# Patient Record
Sex: FEMALE | Race: WHITE | NOT HISPANIC OR LATINO | Employment: FULL TIME | URBAN - NONMETROPOLITAN AREA
[De-identification: names, ages, dates, MRNs, and addresses within clinical notes are randomized per-mention and may not be internally consistent; named-entity substitution may affect disease eponyms.]

---

## 2017-11-02 ENCOUNTER — HOSPITAL ENCOUNTER (OUTPATIENT)
Dept: FAMILY MEDICINE CLINIC | Facility: CLINIC | Age: 42
Setting detail: SPECIMEN
Discharge: HOME | End: 2017-11-02
Attending: INTERNAL MEDICINE | Admitting: INTERNAL MEDICINE

## 2017-11-02 LAB
ANION GAP SERPL CALC-SCNC: 12 MMOL/L (ref 10–20)
BUN SERPL-MCNC: 8 MG/DL (ref 8–20)
BUN/CREAT SERPL: 13.3 (ref 5.4–26.2)
CALCIUM SERPL-MCNC: 9.4 MG/DL (ref 8.9–10.3)
CHLORIDE SERPL-SCNC: 102 MMOL/L (ref 101–111)
CONV CO2: 28 MMOL/L (ref 22–32)
CREAT UR-MCNC: 0.6 MG/DL (ref 0.4–1)
GLUCOSE SERPL-MCNC: 103 MG/DL (ref 65–99)
POTASSIUM SERPL-SCNC: 4 MMOL/L (ref 3.6–5.1)
SODIUM SERPL-SCNC: 138 MMOL/L (ref 136–144)

## 2018-06-14 ENCOUNTER — HOSPITAL ENCOUNTER (OUTPATIENT)
Dept: FAMILY MEDICINE CLINIC | Facility: CLINIC | Age: 43
Setting detail: SPECIMEN
Discharge: HOME | End: 2018-06-14
Attending: INTERNAL MEDICINE | Admitting: INTERNAL MEDICINE

## 2018-06-14 LAB
ANION GAP SERPL CALC-SCNC: 13.9 MMOL/L (ref 10–20)
BUN SERPL-MCNC: 5 MG/DL (ref 8–20)
BUN/CREAT SERPL: 6.3 (ref 5.4–26.2)
CALCIUM SERPL-MCNC: 9.2 MG/DL (ref 8.9–10.3)
CHLORIDE SERPL-SCNC: 101 MMOL/L (ref 101–111)
CONV CO2: 26 MMOL/L (ref 22–32)
CREAT UR-MCNC: 0.8 MG/DL (ref 0.4–1)
GLUCOSE SERPL-MCNC: 86 MG/DL (ref 65–99)
POTASSIUM SERPL-SCNC: 3.9 MMOL/L (ref 3.6–5.1)
SODIUM SERPL-SCNC: 137 MMOL/L (ref 136–144)

## 2019-06-05 ENCOUNTER — CONVERSION ENCOUNTER (OUTPATIENT)
Dept: OTHER | Facility: HOSPITAL | Age: 44
End: 2019-06-05

## 2019-06-05 VITALS
WEIGHT: 293 LBS | DIASTOLIC BLOOD PRESSURE: 74 MMHG | HEIGHT: 65 IN | HEART RATE: 82 BPM | BODY MASS INDEX: 48.82 KG/M2 | SYSTOLIC BLOOD PRESSURE: 107 MMHG | OXYGEN SATURATION: 97 %

## 2019-06-06 NOTE — PROGRESS NOTES
Visit Type:  Follow-up Visit  Primary Care Provider:  Dr. Cayetano Bustillos    Chief Complaint:  6 month: Pedal edema unchanged on Lasix . Tried Chantix and but con't to smoke. .    History of Present Illness:  Dear Dr Bustillos    I had the pleasure of seeing Luiza today in follow-up.  Ensure well aware this is a pleasant 43-year-old  female with no known history of ischemic heart disease.  She does however have a history of hypertension, tachycardia, tobacco abuse and   peripheral edema.  She presents today for follow-up on the above conditions.    She denies any chest pain pressure heaviness or tightness.  She denies any shortness of breath out of character.  She denies any PND orthopnea.  She denies any syncope or near-syncope.      She reports that her edema has not significantly improved. She reports that hearing to the fluid restriction however on further questioning she is consuming well over 2 L of fluid daily.  We discussed increasing physical activity levels.  We also discussed   compression hose artery.  She will be more diligent with her fluid restriction.  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    Impressions  1. peripheral edema -unchanged  2. Hypertension  3. History tachycardia.  4. Tobacco abuse.    Recommendations  Continuation of her current medical regimen at the present time.  Consider compression hose 3.  2 L fluid restriction  Smoking cessation  Follow-up in 6 months time sooner should there be difficulties.      Vital Signs -Extended   Height: 65 inches   Weight: 307.4 pounds  Pulse rate: 82 /min     Pulse rhythm: regular  O2 Sat: 97%  Blood Pressure: 107/74 mm Hg    Calculations   Body Mass Index: 51.34      Body Surface Area (m2): 2.38      Past Medical History:     Reviewed history from 06/06/2018 and no changes required:        Hyperlipidemia        Anxiety        Depression        Hypertension        Palpitations          Tachycardia         Obesity         Osteoarthritis        Bulging Disc         FRANSISCO: C-pap machine     Past Surgical History:     Reviewed history from 10/16/2017 and no changes required:        Hysterectomy :          : ,,        Tonsillectomy    Active Medications:  LORAZEPAM 0.5 MG ORAL TABLET (LORAZEPAM) As needed  ABILIFY 20 MG ORAL TABLET (ARIPIPRAZOLE) Take 1 tablet by mouth daily  LAMICTAL 25 MG ORAL TABLET (LAMOTRIGINE) Take 2 tablets by mouth daily  * VITAMIN D-2  50,000 IU Every week  FOLIC ACID 1 MG ORAL TABLET (FOLIC ACID) Take 1 tablet by mouth daily  METOPROLOL ER 25MG  TAB (METOPROLOL SUCCINATE) TAKE 1 TABLET BY MOUTH ONCE DAILY  TRAZODONE  MG ORAL TABLET (TRAZODONE HCL) Take 2 tablets by mouth daily  POTASSIUM CHLORIDE ER 20 MEQ ORAL TABLET EXTENDED RELEASE (POTASSIUM CHLORIDE) Take 2 tablet by mouth daily  FUROSEMIDE 40 MG ORAL TABLET (FUROSEMIDE) One tablet by mouth daily  ESCITALOPRAM OXALATE 20 MG ORAL TABLET (ESCITALOPRAM OXALATE) Take one (1) tablet by mouth daily.    Current Allergies:  * LISINOPRIL (Critical)  * ATORVASTATIN (Critical)    Family History Summary:      Reviewed history Last on 2018 and no changes required:2019  Brother - Has Coronary Heart Disease - Brother had 2 MI's and PCI/ stenting - Entered On: 10/16/2017  Mother - Has Other Medical Problems - Rheumatoid Arthritis - Entered On: 10/12/2017  Mother - Has Lung Cancer - Entered On: 10/12/2017      Social History:     Reviewed history from 2018 and no changes required:         Marital Status:          Children: 3                Occupation: Employed        Risk Factors:     Smoked Tobacco Use:  Current every day smoker     Cigarettes:  Yes -- 1 to 1.5 pack(s) per day,      Year started:  age 20         Years smoked:  21  Smokeless Tobacco Use:  Never     Counseled to quit/cut down:  yes  Passive smoke exposure:  yes  Drug use:  no  HIV high-risk behavior:  no  Caffeine  use:  3 drinks per day  Alcohol use:  yes     Type:  rarely - wine    Family History Risk Factors:     Family History of MI in females < 65 years old:  no     Family History of MI in males < 55 years old:  yes        Review of Systems     A complete review of systems was undertaken with a pertinent information being listed in history present illness and all other systems being negative.      Physical Exam    General:      well developed, well nourished, in no acute distress.    Head:      normocephalic and atraumatic.    Eyes:      No icterus or conjunctivitis.    Mouth:      Mucous membranes moist no lesions or ulcerations.    Neck:      Supple no lymphadenopathy JVD or bruit.    Lungs:      clear bilaterally to auscultation.    Heart:      non-displaced PMI, chest non-tender; regular rate and rhythm, S1, S2 without murmurs, rubs, or gallops  Abdomen:      Soft nontender nondistended bowel sounds positive throughout.  obese  Pulses:      pulses normal in all 4 extremities.    Extremities:      +edema  Neurologic:      Alert and oriented x3 cranial nerves II through XII are grossly intact.    Psych:      alert and cooperative; normal mood and affect; normal attention span and concentration.      Diabetes Management Exam:      Foot Exam (with socks and/or shoes not present):        Pulses:           pulses normal in all 4 extremities.        Blood Pressure:  Today's BP: 107/74 mm Hg    Labwork:   Most Recent Lab Results:   LDL: 171 mg/dL 04/19/2017      Detailed Cardiovascular Exam    Neck     Carotids: Carotids full and equal bilaterally without bruits.       Neck Veins: Normal, no JVD.      Heart     Inspection: no deformities or lifts noted.       Palpation: normal PMI with no thrills palpable.       Auscultation: regular rate and rhythm, S1, S2 without murmurs, rubs, gallops, or clicks.      Vascular     Pedal Pulses: pulses normal in all 4 extremities.       Radial Pulses: normal radial pulses bilaterally.        Peripheral Circulation: no clubbing, cyanosis,  noted with normal capillary refill.  1+ edema      EKG Interpretation   Comments:  normal sinus rhythm with a ventricular rate of 82 beats per minute.  Low voltage in the precordial leads.  Early repolarization.  Normal QT and QTC intervals.    Assessment   Status of Existing Problems:  Assessed Peripheral edema as unchanged - Nils Reyes MD  Assessed PALPITATIONS as unchanged - Nils Reyes MD  Assessed Hypertension as unchanged - Nils Reyes MD  Assessed Hyperlipidemia as unchanged - Nils Reyes MD  New Problems:  Dx of OBESITY (ICD-278.00)  Onset: 06/05/2019    Plan   New medications:  LAMICTAL 100 MG ORAL TABLET -- Take one (1) tablet by mouth daily.  Start date: 12/05/2018          Patient Instructions:  1)  Please schedule a follow-up appointment in 6 months.  2)  2L fluid restrition  3)  Consider compression stockings  4)  F/U 6 months                Medication Administration    Orders Added:  1)  EKG (In House) [14874]  2)  Ofc Vst, Est Level IV [03965]    ]      Electronically signed by Nils Reyes MD on 06/05/2019 at 2:16 PM  ________________________________________________________________________       Disclaimer: Converted Note message may not contain all data elements that existed in the legacy source system. Please see [employer] Legacy System for the original note details.

## 2019-08-06 RX ORDER — POTASSIUM CHLORIDE 20 MEQ/1
20 TABLET, EXTENDED RELEASE ORAL 2 TIMES DAILY
Qty: 60 TABLET | Refills: 6 | Status: SHIPPED | OUTPATIENT
Start: 2019-08-06 | End: 2021-12-06

## 2019-08-06 NOTE — TELEPHONE ENCOUNTER
Patient called to report her pharmacy [facility] in [place] has reported to her that they have tried to contact our office for refills on K+ , but no response.   Patient stated has been out of her K+ 20meq 2 tablets daily for 2 weeks.   Informed patient that no requests have been received on our end , but will send refill now.

## 2019-09-09 RX ORDER — FUROSEMIDE 40 MG/1
40 TABLET ORAL DAILY
Qty: 90 TABLET | Refills: 3 | Status: SHIPPED | OUTPATIENT
Start: 2019-09-09 | End: 2020-09-28

## 2019-12-04 ENCOUNTER — OFFICE VISIT (OUTPATIENT)
Dept: CARDIOLOGY | Facility: CLINIC | Age: 44
End: 2019-12-04

## 2019-12-04 VITALS
BODY MASS INDEX: 48.12 KG/M2 | WEIGHT: 288.8 LBS | HEIGHT: 65 IN | SYSTOLIC BLOOD PRESSURE: 111 MMHG | HEART RATE: 79 BPM | OXYGEN SATURATION: 96 % | DIASTOLIC BLOOD PRESSURE: 78 MMHG

## 2019-12-04 DIAGNOSIS — R60.9 PERIPHERAL EDEMA: ICD-10-CM

## 2019-12-04 DIAGNOSIS — Z72.0 TOBACCO USE: ICD-10-CM

## 2019-12-04 DIAGNOSIS — E66.01 CLASS 3 SEVERE OBESITY WITHOUT SERIOUS COMORBIDITY WITH BODY MASS INDEX (BMI) OF 45.0 TO 49.9 IN ADULT, UNSPECIFIED OBESITY TYPE (HCC): ICD-10-CM

## 2019-12-04 DIAGNOSIS — E78.2 MIXED HYPERLIPIDEMIA: ICD-10-CM

## 2019-12-04 DIAGNOSIS — I10 ESSENTIAL HYPERTENSION: Primary | ICD-10-CM

## 2019-12-04 PROCEDURE — 99214 OFFICE O/P EST MOD 30 MIN: CPT | Performed by: INTERNAL MEDICINE

## 2019-12-04 PROCEDURE — 93000 ELECTROCARDIOGRAM COMPLETE: CPT | Performed by: INTERNAL MEDICINE

## 2019-12-04 RX ORDER — LORAZEPAM 1 MG/1
1 TABLET ORAL DAILY PRN
Refills: 1 | COMMUNITY
Start: 2019-11-19 | End: 2023-01-16 | Stop reason: ALTCHOICE

## 2019-12-04 RX ORDER — METOPROLOL SUCCINATE 25 MG/1
TABLET, EXTENDED RELEASE ORAL DAILY
Refills: 7 | COMMUNITY
Start: 2019-10-18 | End: 2020-03-26 | Stop reason: SDUPTHER

## 2019-12-04 RX ORDER — LAMOTRIGINE 100 MG/1
100 TABLET ORAL DAILY
Refills: 1 | COMMUNITY
Start: 2019-11-19 | End: 2023-01-16 | Stop reason: ALTCHOICE

## 2019-12-04 RX ORDER — PRAVASTATIN SODIUM 20 MG
20 TABLET ORAL DAILY
Refills: 3 | COMMUNITY
Start: 2019-11-19 | End: 2022-07-18 | Stop reason: ALTCHOICE

## 2019-12-04 NOTE — PROGRESS NOTES
"Cardiology Office Visit      Encounter Date:  12/04/2019    Patient ID:   Luiza Felton is a 44 y.o. female.    Reason For Followup:  Hypertension  Peripheral edema    Brief Clinical History:  Dear Dr. Bustillos, Cayetano CAMACHO MD    I had the pleasure of seeing Luiza Felton today. As you are well aware, this is a 44 y.o. female with no known history of ischemic heart disease.  She does however have a history of hypertension, tachycardia, tobacco abuse and peripheral edema.  She presents today for follow-up on the above conditions.    Interval History:  She denies any chest pain pressure heaviness or tightness.  She denies any shortness of breath out of character.  She denies any PND orthopnea.  She denies any syncope or near-syncope.  She does report occasional lightheadedness.  She reports that her edema has significantly improved.  In fact she reports about 70% improvement since her last visit.    Assessment & Plan    Impressions:  Peripheral edema-improved with fluid restriction  Hypertension-well-controlled today.  History tachycardia.  Tobacco abuse.    Recommendations:  Continuation of her current cardiovascular regimen at the present time.  Smoking cessation  Follow-up in 1 years time sooner should there be difficulties.    Objective:    Vitals:  Vitals:    12/04/19 1202   BP: 111/78   Pulse: 79   SpO2: 96%   Weight: 131 kg (288 lb 12.8 oz)   Height: 165.1 cm (65\")       Physical Exam:    General: Alert, cooperative, no distress, appears stated age  Head:  Normocephalic, atraumatic, mucous membranes moist  Eyes:  Conjunctiva/corneas clear, EOM's intact     Neck:  Supple,  no adenopathy;      Lungs: Clear to auscultation bilaterally, no wheezes rhonchi rales are noted  Chest wall: No tenderness  Heart::  Regular rate and rhythm, S1 and S2 normal, no murmur, rub or gallop  Abdomen: Soft, non-tender, nondistended bowel sounds active.  Morbidly obese  Extremities: No cyanosis, clubbing, or edema  Pulses: 2+ and " symmetric all extremities.  Trace pedal edema  Skin:  No rashes or lesions  Neuro/psych: A&O x3. CN II through XII are grossly intact with appropriate affect      Allergies:  Allergies   Allergen Reactions   • Atorvastatin Myalgia   • Lisinopril Cough       Medication Review:     Current Outpatient Medications:   •  furosemide (LASIX) 40 MG tablet, Take 1 tablet by mouth Daily., Disp: 90 tablet, Rfl: 3  •  lamoTRIgine (LaMICtal) 200 MG tablet, Take 200 mg by mouth Daily., Disp: , Rfl: 1  •  LORazepam (ATIVAN) 0.5 MG tablet, Take 0.5 mg by mouth Daily As Needed., Disp: , Rfl: 1  •  metoprolol succinate XL (TOPROL-XL) 25 MG 24 hr tablet, Daily., Disp: , Rfl: 7  •  potassium chloride (K-DUR,KLOR-CON) 20 MEQ CR tablet, Take 1 tablet by mouth 2 (Two) Times a Day., Disp: 60 tablet, Rfl: 6  •  pravastatin (PRAVACHOL) 20 MG tablet, Take 20 mg by mouth Daily. as directed, Disp: , Rfl: 3    Family History:  Family History   Problem Relation Age of Onset   • Lung cancer Mother    • Rheum arthritis Mother    • Heart disease Brother    • Heart attack Brother    • Stroke Maternal Grandmother        Past Medical History:  Past Medical History:   Diagnosis Date   • Anxiety and depression    • Bulging lumbar disc    • Hyperlipidemia    • Hypertension    • Obesity    • Osteoarthritis    • Palpitations    • Peripheral edema    • Sleep apnea     C-pap machine    • Tachycardia        Past surgical History:  Past Surgical History:   Procedure Laterality Date   •  SECTION     • HYSTERECTOMY     • TONSILLECTOMY         Social History:  Social History     Socioeconomic History   • Marital status:      Spouse name: Not on file   • Number of children: Not on file   • Years of education: Not on file   • Highest education level: Not on file   Tobacco Use   • Smoking status: Current Every Day Smoker     Packs/day: 1.00     Types: Cigarettes   • Smokeless tobacco: Never Used   Substance and Sexual Activity   • Alcohol use: No      Frequency: Never   • Drug use: No       Review of Systems:  The following systems were reviewed as they relate to the cardiovascular system: Constitutional, Eyes, ENT, Cardiovascular, Respiratory, Gastrointestinal, Integumentary, Neurological, Psychiatric, Hematologic, Endocrine, Musculoskeletal, and Genitourinary. The pertinent cardiovascular findings are reported above with all other cardiovascular points within those systems being negative.    Diagnostic Study Review:     Current Electrocardiogram:    ECG 12 Lead  Date/Time: 12/4/2019 5:56 PM  Performed by: Nils Reyes DO  Authorized by: Nils Reyes DO   Comparison: not compared with previous ECG   Previous ECG: no previous ECG available  Comments: Normal sinus rhythm with a ventricular rate of 79 bpm.  Low voltage in the precordial leads.  Normal QT and QTc intervals.  Normal QRS axis.              NOTE: The following portions of the patient's history were reviewed and updated this visit as appropriate: allergies, current medications, past family history, past medical history, past social history, past surgical history and problem list.

## 2019-12-10 PROBLEM — R60.9 PERIPHERAL EDEMA: Status: ACTIVE | Noted: 2018-12-05

## 2019-12-10 PROBLEM — E66.9 OBESITY: Status: ACTIVE | Noted: 2019-06-05

## 2019-12-10 PROBLEM — Z72.0 TOBACCO USE: Status: ACTIVE | Noted: 2019-12-10

## 2019-12-10 PROBLEM — I10 HYPERTENSION: Status: ACTIVE | Noted: 2017-10-12

## 2019-12-10 PROBLEM — E78.5 HYPERLIPIDEMIA: Status: ACTIVE | Noted: 2017-10-12

## 2020-03-26 RX ORDER — METOPROLOL SUCCINATE 25 MG/1
25 TABLET, EXTENDED RELEASE ORAL DAILY
Qty: 30 TABLET | Refills: 3 | Status: SHIPPED | OUTPATIENT
Start: 2020-03-26 | End: 2020-09-28

## 2020-09-28 RX ORDER — FUROSEMIDE 40 MG/1
TABLET ORAL
Qty: 30 TABLET | Refills: 0 | Status: SHIPPED | OUTPATIENT
Start: 2020-09-28 | End: 2020-11-04

## 2020-09-28 RX ORDER — METOPROLOL SUCCINATE 25 MG/1
TABLET, EXTENDED RELEASE ORAL
Qty: 30 TABLET | Refills: 0 | Status: SHIPPED | OUTPATIENT
Start: 2020-09-28 | End: 2020-11-04

## 2020-11-04 RX ORDER — FUROSEMIDE 40 MG/1
TABLET ORAL
Qty: 30 TABLET | Refills: 6 | Status: SHIPPED | OUTPATIENT
Start: 2020-11-04 | End: 2021-08-04

## 2020-11-04 RX ORDER — METOPROLOL SUCCINATE 25 MG/1
TABLET, EXTENDED RELEASE ORAL
Qty: 30 TABLET | Refills: 6 | Status: SHIPPED | OUTPATIENT
Start: 2020-11-04 | End: 2021-08-04

## 2020-12-07 ENCOUNTER — OFFICE VISIT (OUTPATIENT)
Dept: CARDIOLOGY | Facility: CLINIC | Age: 45
End: 2020-12-07

## 2020-12-07 VITALS
DIASTOLIC BLOOD PRESSURE: 83 MMHG | RESPIRATION RATE: 18 BRPM | WEIGHT: 293 LBS | HEIGHT: 65 IN | BODY MASS INDEX: 48.82 KG/M2 | OXYGEN SATURATION: 98 % | HEART RATE: 79 BPM | SYSTOLIC BLOOD PRESSURE: 149 MMHG

## 2020-12-07 DIAGNOSIS — R60.9 PERIPHERAL EDEMA: ICD-10-CM

## 2020-12-07 DIAGNOSIS — E78.2 MIXED HYPERLIPIDEMIA: ICD-10-CM

## 2020-12-07 DIAGNOSIS — E66.01 CLASS 3 SEVERE OBESITY WITHOUT SERIOUS COMORBIDITY WITH BODY MASS INDEX (BMI) OF 45.0 TO 49.9 IN ADULT, UNSPECIFIED OBESITY TYPE (HCC): ICD-10-CM

## 2020-12-07 DIAGNOSIS — Z72.0 TOBACCO USE: ICD-10-CM

## 2020-12-07 DIAGNOSIS — I10 ESSENTIAL HYPERTENSION: Primary | ICD-10-CM

## 2020-12-07 PROCEDURE — 99214 OFFICE O/P EST MOD 30 MIN: CPT | Performed by: INTERNAL MEDICINE

## 2020-12-07 PROCEDURE — 93000 ELECTROCARDIOGRAM COMPLETE: CPT | Performed by: INTERNAL MEDICINE

## 2020-12-07 RX ORDER — ESZOPICLONE 2 MG/1
2 TABLET, FILM COATED ORAL NIGHTLY
COMMUNITY
End: 2023-01-16 | Stop reason: ALTCHOICE

## 2020-12-07 RX ORDER — ESCITALOPRAM OXALATE 20 MG/1
20 TABLET ORAL DAILY
COMMUNITY
End: 2023-01-16 | Stop reason: ALTCHOICE

## 2020-12-07 NOTE — PROGRESS NOTES
"Cardiology Office Visit      Encounter Date:  12/07/2020    Patient ID:   Luiza Felton is a 45 y.o. female.    Reason For Followup:  Peripheral edema  Hypertension    Brief Clinical History:  Dear Dr. Bustillos, Cayetano CAMACHO MD    I had the pleasure of seeing Luiza Felton today. As you are well aware, this is a 45 y.o. female with no known history of ischemic heart disease.  She does however have a history of hypertension, tachycardia, tobacco abuse and peripheral edema.  She presents today for follow-up on the above conditions.    Interval History:  She denies any chest pain pressure heaviness or tightness.  She denies any shortness of breath out of character.  She denies any PND orthopnea.  She denies any syncope or near-syncope.  She does report occasional lightheadedness.  She reports that her edema has remained stagnant since her last visit.    She additionally reports that she is not taking her potassium because the pills make her sick.  She continues to watch her fluids although it is difficult.  She is also still smoking.  Her blood pressure is elevated today and she is not checking her blood pressure at home.  She will get some labs to check her potassium and she will monitor her blood pressure for the next 2 weeks and call with values.  It may be that she needs liquid potassium.    Assessment & Plan    Impressions:  Peripheral edema-improved with fluid restriction  Hypertension-well-controlled today.  History tachycardia.  Tobacco abuse.    Recommendations:  Continuation of her current cardiovascular regimen at the present time.  Check labs: CBC, CMP, lipids  Check blood pressure and notify of values in 2 weeks  Smoking cessation  Follow-up in 1 years time sooner should there be difficulties.    Objective:    Vitals:  Vitals:    12/07/20 1057   BP: 149/83   BP Location: Left arm   Pulse: 79   Resp: 18   SpO2: 98%   Weight: (!) 139 kg (306 lb 12.8 oz)   Height: 165.1 cm (65\")       Physical " Exam:    General: Alert, cooperative, no distress, appears stated age  Head:  Normocephalic, atraumatic, mucous membranes moist  Eyes:  Conjunctiva/corneas clear, EOM's intact     Neck:  Supple,  no bruit  Lungs: Clear to auscultation bilaterally, no wheezes rhonchi rales are noted  Chest wall: No tenderness  Heart::  Regular rate and rhythm, S1 and S2 normal, 1/6 holosystolic murmur.  No rub or gallop  Abdomen: Soft, non-tender, nondistended bowel sounds active.  Obese  Extremities: No cyanosis, clubbing, or edema  Pulses: 2+ and symmetric all extremities  Skin:  No rashes or lesions  Neuro/psych: A&O x3. CN II through XII are grossly intact with appropriate affect      Allergies:  Allergies   Allergen Reactions   • Atorvastatin Myalgia   • Lisinopril Cough       Medication Review:     Current Outpatient Medications:   •  Cholecalciferol (vitamin D3) 125 MCG (5000 UT) capsule capsule, Take 5,000 Units by mouth Daily., Disp: , Rfl:   •  escitalopram (LEXAPRO) 20 MG tablet, Take 20 mg by mouth Daily., Disp: , Rfl:   •  eszopiclone (Lunesta) 2 MG tablet, Take 2 mg by mouth Every Night. Take immediately before bedtime, Disp: , Rfl:   •  furosemide (LASIX) 40 MG tablet, Take 1 tablet by mouth once daily, Disp: 30 tablet, Rfl: 6  •  lamoTRIgine (LaMICtal) 100 MG tablet, Take 100 mg by mouth Daily., Disp: , Rfl: 1  •  LORazepam (ATIVAN) 1 MG tablet, Take 1 mg by mouth Daily As Needed., Disp: , Rfl: 1  •  metoprolol succinate XL (TOPROL-XL) 25 MG 24 hr tablet, Take 1 tablet by mouth once daily, Disp: 30 tablet, Rfl: 6  •  potassium chloride (K-DUR,KLOR-CON) 20 MEQ CR tablet, Take 1 tablet by mouth 2 (Two) Times a Day. (Patient taking differently: Take 20 mEq by mouth Daily. 2 tablets by mouth daily), Disp: 60 tablet, Rfl: 6  •  pravastatin (PRAVACHOL) 20 MG tablet, Take 20 mg by mouth Daily. as directed, Disp: , Rfl: 3    Family History:  Family History   Problem Relation Age of Onset   • Lung cancer Mother    • Rheum  arthritis Mother    • Heart disease Brother    • Heart attack Brother    • Stroke Maternal Grandmother        Past Medical History:  Past Medical History:   Diagnosis Date   • Anxiety and depression    • Bulging lumbar disc    • Hyperlipidemia    • Hypertension    • Obesity    • Osteoarthritis    • Palpitations    • Peripheral edema    • Sleep apnea     C-pap machine    • Tachycardia        Past surgical History:  Past Surgical History:   Procedure Laterality Date   •  SECTION     • HYSTERECTOMY     • TONSILLECTOMY         Social History:  Social History     Socioeconomic History   • Marital status:      Spouse name: Not on file   • Number of children: Not on file   • Years of education: Not on file   • Highest education level: Not on file   Tobacco Use   • Smoking status: Current Every Day Smoker     Packs/day: 1.50     Types: Cigarettes   • Smokeless tobacco: Never Used   Substance and Sexual Activity   • Alcohol use: No     Frequency: Never   • Drug use: No       Review of Systems:  The following systems were reviewed as they relate to the cardiovascular system: Constitutional, Eyes, ENT, Cardiovascular, Respiratory, Gastrointestinal, Integumentary, Neurological, Psychiatric, Hematologic, Endocrine, Musculoskeletal, and Genitourinary. The pertinent cardiovascular findings are reported above with all other cardiovascular points within those systems being negative.    Diagnostic Study Review:     Current Electrocardiogram:    ECG 12 Lead    Date/Time: 2020 1:19 PM  Performed by: Nils Reyes DO  Authorized by: Nils Reyes DO   Comparison: not compared with previous ECG   Previous ECG: no previous ECG available  Comments: Normal sinus rhythm with a ventricular rate of 79 bpm.  Low voltage in the precordial leads.  Normal QT and QTc intervals.  Nonspecific repolarization changes.              NOTE: The following portions of the patient's history were reviewed and  updated this visit as appropriate: allergies, current medications, past family history, past medical history, past social history, past surgical history and problem list.

## 2021-08-04 RX ORDER — METOPROLOL SUCCINATE 25 MG/1
TABLET, EXTENDED RELEASE ORAL
Qty: 30 TABLET | Refills: 2 | Status: SHIPPED | OUTPATIENT
Start: 2021-08-04 | End: 2021-12-06 | Stop reason: SDUPTHER

## 2021-08-04 RX ORDER — FUROSEMIDE 40 MG/1
TABLET ORAL
Qty: 30 TABLET | Refills: 2 | Status: SHIPPED | OUTPATIENT
Start: 2021-08-04 | End: 2021-12-06 | Stop reason: SDUPTHER

## 2021-12-06 ENCOUNTER — OFFICE VISIT (OUTPATIENT)
Dept: CARDIOLOGY | Facility: CLINIC | Age: 46
End: 2021-12-06

## 2021-12-06 VITALS
SYSTOLIC BLOOD PRESSURE: 131 MMHG | DIASTOLIC BLOOD PRESSURE: 82 MMHG | HEART RATE: 79 BPM | HEIGHT: 65 IN | WEIGHT: 293 LBS | OXYGEN SATURATION: 99 % | BODY MASS INDEX: 48.82 KG/M2

## 2021-12-06 DIAGNOSIS — E78.1 HYPERTRIGLYCERIDEMIA: ICD-10-CM

## 2021-12-06 DIAGNOSIS — I10 PRIMARY HYPERTENSION: ICD-10-CM

## 2021-12-06 DIAGNOSIS — E78.2 MIXED HYPERLIPIDEMIA: Primary | ICD-10-CM

## 2021-12-06 DIAGNOSIS — R00.2 PALPITATIONS: ICD-10-CM

## 2021-12-06 PROCEDURE — 99214 OFFICE O/P EST MOD 30 MIN: CPT | Performed by: INTERNAL MEDICINE

## 2021-12-06 PROCEDURE — 93000 ELECTROCARDIOGRAM COMPLETE: CPT | Performed by: INTERNAL MEDICINE

## 2021-12-06 RX ORDER — METOPROLOL SUCCINATE 25 MG/1
25 TABLET, EXTENDED RELEASE ORAL DAILY
Qty: 90 TABLET | Refills: 3 | Status: SHIPPED | OUTPATIENT
Start: 2021-12-06 | End: 2023-01-16 | Stop reason: SDUPTHER

## 2021-12-06 RX ORDER — FUROSEMIDE 40 MG/1
40 TABLET ORAL DAILY
Qty: 90 TABLET | Refills: 3 | Status: SHIPPED | OUTPATIENT
Start: 2021-12-06

## 2021-12-06 RX ORDER — POTASSIUM CHLORIDE 20 MEQ/1
20 TABLET, EXTENDED RELEASE ORAL DAILY
Qty: 90 TABLET | Refills: 3 | Status: SHIPPED | OUTPATIENT
Start: 2021-12-06

## 2021-12-06 NOTE — PROGRESS NOTES
Cardiology Office Visit      Encounter Date:  12/06/2021    Patient ID:   Luiza Felton is a 46 y.o. female.    Reason For Followup:  Peripheral edema  Hypertension    Brief Clinical History:  Dear Dr. Bustillos, Cayetano CAMACHO MD    I had the pleasure of seeing Luiza Felton today. As you are well aware, this is a 46 y.o. female with no known history of ischemic heart disease.  She does however have a history of hypertension, tachycardia, tobacco abuse and peripheral edema.  She presents today for follow-up on the above conditions.    Interval History:  She denies any chest pain pressure heaviness or tightness.  She denies any shortness of breath out of character.  She denies any PND orthopnea.  She denies any syncope or near-syncope.      She is reporting an increase in her palpitations.  She reports that these can occur multiple times a week and sometimes multiple times a day.  She reports that she is having some difficulty taking her potassium because of the large caliber of the pill.  We discussed how taking a diuretic without the potassium supplementation can lead to electrical instability resulting in palpitations.  She will resume taking potassium and notify us if her palpitations are persistent.    Additionally she reports that she is having some difficulty with hyperlipidemia.  In fact with review of her laboratory data, it appears that this is primarily a hypertriglyceridemia issue.  Her triglycerides from November 2021 were 1285.  She was unable to tolerate atorvastatin but is tolerating moderate dose of pravastatin.  Statin therapy alone will not resolve her hypertriglyceridemia.  We discussed options and will go with Repatha.  Follow-up lipid profile 6 weeks after initiation of therapy will be performed.    Assessment & Plan    Impressions:  Peripheral edema-improved with fluid restriction  Hypertension-well-controlled today.  Palpitations  Hypertriglyceridemia  History tachycardia.  Tobacco  "abuse.    Recommendations:  Resume potassium and notify if palpitations persist  Start Repatha 1 cc subcu q. 14 days  Lipid profile 6 weeks  Follow-up 6 months    Objective:    Vitals:  Vitals:    12/06/21 1039   BP: 131/82   BP Location: Left arm   Patient Position: Sitting   Cuff Size: Large Adult   Pulse: 79   SpO2: 99%   Weight: 136 kg (300 lb)   Height: 165.1 cm (65\")       Physical Exam:    General: Alert, cooperative, no distress, appears stated age  Head:  Normocephalic, atraumatic, mucous membranes moist  Eyes:  Conjunctiva/corneas clear, EOM's intact     Neck:  Supple,  no bruit  Lungs: Clear to auscultation bilaterally, no wheezes rhonchi rales are noted  Chest wall: No tenderness  Heart::  Regular rate and rhythm, S1 and S2 normal, 1/6 holosystolic murmur.  No rub or gallop  Abdomen: Soft, non-tender, nondistended bowel sounds active.  Obese  Extremities: No cyanosis, clubbing, or edema  Pulses: 2+ and symmetric all extremities  Skin:  No rashes or lesions  Neuro/psych: A&O x3. CN II through XII are grossly intact with appropriate affect      Allergies:  Allergies   Allergen Reactions   • Atorvastatin Myalgia   • Lisinopril Cough       Medication Review:     Current Outpatient Medications:   •  Cholecalciferol (vitamin D3) 125 MCG (5000 UT) capsule capsule, Take 5,000 Units by mouth Daily., Disp: , Rfl:   •  escitalopram (LEXAPRO) 20 MG tablet, Take 20 mg by mouth Daily., Disp: , Rfl:   •  eszopiclone (Lunesta) 2 MG tablet, Take 2 mg by mouth Every Night. Take immediately before bedtime, Disp: , Rfl:   •  furosemide (LASIX) 40 MG tablet, Take 1 tablet by mouth once daily, Disp: 30 tablet, Rfl: 2  •  lamoTRIgine (LaMICtal) 100 MG tablet, Take 100 mg by mouth Daily., Disp: , Rfl: 1  •  LORazepam (ATIVAN) 1 MG tablet, Take 1 mg by mouth Daily As Needed., Disp: , Rfl: 1  •  metoprolol succinate XL (TOPROL-XL) 25 MG 24 hr tablet, Take 1 tablet by mouth once daily, Disp: 30 tablet, Rfl: 2  •  potassium " chloride (K-DUR,KLOR-CON) 20 MEQ CR tablet, Take 1 tablet by mouth 2 (Two) Times a Day. (Patient taking differently: Take 20 mEq by mouth Daily. 2 tablets by mouth daily), Disp: 60 tablet, Rfl: 6  •  pravastatin (PRAVACHOL) 20 MG tablet, Take 20 mg by mouth Daily. as directed, Disp: , Rfl: 3    Family History:  Family History   Problem Relation Age of Onset   • Lung cancer Mother    • Rheum arthritis Mother    • Heart disease Brother    • Heart attack Brother    • Stroke Maternal Grandmother        Past Medical History:  Past Medical History:   Diagnosis Date   • Anxiety and depression    • Bulging lumbar disc    • Hyperlipidemia    • Hypertension    • Obesity    • Osteoarthritis    • Palpitations    • Peripheral edema    • Sleep apnea     C-pap machine    • Tachycardia        Past surgical History:  Past Surgical History:   Procedure Laterality Date   •  SECTION     • HYSTERECTOMY     • TONSILLECTOMY         Social History:  Social History     Socioeconomic History   • Marital status:    Tobacco Use   • Smoking status: Current Every Day Smoker     Packs/day: 1.50     Types: Cigarettes   • Smokeless tobacco: Never Used   Vaping Use   • Vaping Use: Never used   Substance and Sexual Activity   • Alcohol use: No   • Drug use: No   • Sexual activity: Defer       Review of Systems:  The following systems were reviewed as they relate to the cardiovascular system: Constitutional, Eyes, ENT, Cardiovascular, Respiratory, Gastrointestinal, Integumentary, Neurological, Psychiatric, Hematologic, Endocrine, Musculoskeletal, and Genitourinary. The pertinent cardiovascular findings are reported above with all other cardiovascular points within those systems being negative.    Diagnostic Study Review:     Current Electrocardiogram:    ECG 12 Lead    Date/Time: 2021 12:01 PM  Performed by: Nils Reyes DO  Authorized by: Nils Reyes DO   Comparison: not compared with previous ECG    Previous ECG: no previous ECG available  Comments: Normal sinus rhythm with a ventricular rate of 78 bpm.  Low voltage in the precordial leads.  Normal QT and QTc intervals.            NOTE: The following portions of the patient's note were reviewed, confirmed and/or updated this visit as appropriate: History of present illness/Interval history, physical examination, assessment & plan, allergies, current medications, past family history, past medical history, past social history, past surgical history and problem list.

## 2021-12-06 NOTE — PATIENT INSTRUCTIONS
Try taking potassium as prescribed  If still having palpitations and taking potassium call back and we can get a monitor placed  Follow-up in 6 months  Lipid panel 6 weeks after starting Repatha

## 2022-01-12 RX ORDER — ALIROCUMAB 75 MG/ML
75 INJECTION, SOLUTION SUBCUTANEOUS
Qty: 2.24 ML | Refills: 3 | Status: SHIPPED | OUTPATIENT
Start: 2022-01-12 | End: 2022-07-18

## 2022-01-19 ENCOUNTER — TELEPHONE (OUTPATIENT)
Dept: CARDIOLOGY | Facility: CLINIC | Age: 47
End: 2022-01-19

## 2022-01-19 NOTE — TELEPHONE ENCOUNTER
----- Message from Nils Reyes DO sent at 1/12/2022  2:41 PM EST -----  Try Praluent  ----- Message -----  From: Yakelin Vicente MA  Sent: 1/12/2022   2:23 PM EST  To: Nils Reyes DO    Pt repatha was denied by the insurance company. What would you like to change it to?

## 2022-01-19 NOTE — TELEPHONE ENCOUNTER
Pt staying with the Repatha because her secondary insurance will cover. Pt will call with any other concerns.

## 2022-07-18 ENCOUNTER — OFFICE VISIT (OUTPATIENT)
Dept: CARDIOLOGY | Facility: CLINIC | Age: 47
End: 2022-07-18

## 2022-07-18 VITALS
WEIGHT: 271 LBS | BODY MASS INDEX: 45.15 KG/M2 | HEART RATE: 101 BPM | OXYGEN SATURATION: 97 % | DIASTOLIC BLOOD PRESSURE: 92 MMHG | HEIGHT: 65 IN | SYSTOLIC BLOOD PRESSURE: 132 MMHG

## 2022-07-18 DIAGNOSIS — E78.1 HYPERTRIGLYCERIDEMIA: ICD-10-CM

## 2022-07-18 DIAGNOSIS — E78.2 MIXED HYPERLIPIDEMIA: ICD-10-CM

## 2022-07-18 DIAGNOSIS — I10 PRIMARY HYPERTENSION: Primary | ICD-10-CM

## 2022-07-18 DIAGNOSIS — E78.1 FAMILIAL HYPERTRIGLYCERIDEMIA: ICD-10-CM

## 2022-07-18 PROCEDURE — 93000 ELECTROCARDIOGRAM COMPLETE: CPT | Performed by: INTERNAL MEDICINE

## 2022-07-18 PROCEDURE — 99214 OFFICE O/P EST MOD 30 MIN: CPT | Performed by: INTERNAL MEDICINE

## 2022-07-18 RX ORDER — ALIROCUMAB 75 MG/ML
75 INJECTION, SOLUTION SUBCUTANEOUS
Qty: 2.24 ML | Refills: 3 | Status: SHIPPED | OUTPATIENT
Start: 2022-07-18 | End: 2022-07-18

## 2022-07-18 RX ORDER — PRAVASTATIN SODIUM 40 MG
40 TABLET ORAL
COMMUNITY
Start: 2022-06-13

## 2022-07-18 NOTE — PROGRESS NOTES
Cardiology Office Visit      Encounter Date:  07/18/2022    Patient ID:   Luiza Felton is a 46 y.o. female.    Reason For Followup:  Peripheral edema  Hypertension    Brief Clinical History:  Dear Dr. Bustillos, Cayetano CAMACHO MD    I had the pleasure of seeing Luiza Felton today. As you are well aware, this is a 46 y.o. female with no known history of ischemic heart disease.  She does however have a history of hypertension, tachycardia, tobacco abuse and peripheral edema.  She presents today for follow-up on the above conditions.    Interval History:  She denies any chest pain pressure heaviness or tightness.  She denies any shortness of breath out of character.  She denies any PND orthopnea.  She denies any syncope or near-syncope.      She reports that her palpitations have completely resolved with the addition of potassium.    We had tried to start her on PCSK9 inhibition after her last visit.  Unfortunately there seem to be some issues with her insurance.  We will try to get a prior authorization for this.  As you will recall, she has some difficulty with hyperlipidemia.  In fact with review of her laboratory data, it appears that this is primarily a hypertriglyceridemia issue.  Her triglycerides from November 2021 were 1285.  She was unable to tolerate atorvastatin but is tolerating moderate dose of pravastatin.  Statin therapy alone will not resolve her hypertriglyceridemia.  We discussed options and will go with Repatha.      Assessment & Plan    Impressions:  Peripheral edema-improved with fluid restriction  Hypertension-well-controlled today.  Palpitations  Familial hypertriglyceridemia     Intolerance to most statins (tolerating moderate dose of pravastatin)     Quite suboptimal control  History tachycardia.  Tobacco abuse.    Recommendations:  Start Repatha 1 cc subcu q. 14 days     Will need PA  Lipid profile 6 weeks  Follow-up 6 months    Diagnoses and all orders for this visit:    1. Primary hypertension  "(Primary)  -     ECG 12 Lead    2. Mixed hyperlipidemia  -     ECG 12 Lead    3. Hypertriglyceridemia  -     ECG 12 Lead    4. Familial hypertriglyceridemia    Other orders  -     Discontinue: Alirocumab (Praluent) 75 MG/ML solution auto-injector; Inject 1 mL under the skin into the appropriate area as directed Every 14 (Fourteen) Days.  Dispense: 2.24 mL; Refill: 3  -     Evolocumab (REPATHA) solution prefilled syringe injection; Inject 1 mL under the skin into the appropriate area as directed Every 14 (Fourteen) Days.  Dispense: 1 mL; Refill: 6          Objective:    Vitals:  Vitals:    07/18/22 1038   BP: 132/92   Pulse: 101   SpO2: 97%   Weight: 123 kg (271 lb)   Height: 165.1 cm (65\")     Body mass index is 45.1 kg/m².      Physical Exam:    General: Alert, cooperative, no distress, appears stated age  Head:  Normocephalic, atraumatic, mucous membranes moist  Eyes:  Conjunctiva/corneas clear, EOM's intact     Neck:  Supple,  no bruit    Lungs: Clear to auscultation bilaterally, no wheezes rhonchi rales are noted  Chest wall: No tenderness  Heart::  Regular rate and rhythm, S1 and S2 normal, 1/6 holosystolic murmur.  No rub or gallop  Abdomen: Soft, non-tender, nondistended bowel sounds active.  Obese  Extremities: No cyanosis, clubbing, or edema  Pulses: 2+ and symmetric all extremities  Skin:  No rashes or lesions  Neuro/psych: A&O x3. CN II through XII are grossly intact with appropriate affect      Allergies:  Allergies   Allergen Reactions   • Atorvastatin Myalgia   • Lisinopril Cough       Medication Review:     Current Outpatient Medications:   •  furosemide (LASIX) 40 MG tablet, Take 1 tablet by mouth Daily., Disp: 90 tablet, Rfl: 3  •  metFORMIN (GLUCOPHAGE) 1000 MG tablet, Take 1,000 mg by mouth 2 (Two) Times a Day., Disp: , Rfl:   •  metoprolol succinate XL (TOPROL-XL) 25 MG 24 hr tablet, Take 1 tablet by mouth Daily., Disp: 90 tablet, Rfl: 3  •  potassium chloride (K-DUR,KLOR-CON) 20 MEQ CR tablet, " Take 1 tablet by mouth Daily., Disp: 90 tablet, Rfl: 3  •  pravastatin (PRAVACHOL) 40 MG tablet, Take 40 mg by mouth every night at bedtime., Disp: , Rfl:   •  Cholecalciferol (vitamin D3) 125 MCG (5000 UT) capsule capsule, Take 5,000 Units by mouth Daily., Disp: , Rfl:   •  escitalopram (LEXAPRO) 20 MG tablet, Take 20 mg by mouth Daily., Disp: , Rfl:   •  eszopiclone (LUNESTA) 2 MG tablet, Take 2 mg by mouth Every Night. Take immediately before bedtime, Disp: , Rfl:   •  Evolocumab (REPATHA) solution prefilled syringe injection, Inject 1 mL under the skin into the appropriate area as directed Every 14 (Fourteen) Days., Disp: 1 mL, Rfl: 6  •  lamoTRIgine (LaMICtal) 100 MG tablet, Take 100 mg by mouth Daily., Disp: , Rfl: 1  •  LORazepam (ATIVAN) 1 MG tablet, Take 1 mg by mouth Daily As Needed., Disp: , Rfl: 1    Family History:  Family History   Problem Relation Age of Onset   • Lung cancer Mother    • Rheum arthritis Mother    • Heart disease Brother    • Heart attack Brother    • Stroke Maternal Grandmother        Past Medical History:  Past Medical History:   Diagnosis Date   • Anxiety and depression    • Bulging lumbar disc    • Hyperlipidemia    • Hypertension    • Obesity    • Osteoarthritis    • Palpitations    • Peripheral edema    • Sleep apnea     C-pap machine    • Tachycardia        Past Surgical History:  Past Surgical History:   Procedure Laterality Date   •  SECTION     • HYSTERECTOMY     • TONSILLECTOMY         Social History:  Social History     Socioeconomic History   • Marital status:    Tobacco Use   • Smoking status: Current Every Day Smoker     Packs/day: 1.50     Types: Cigarettes   • Smokeless tobacco: Never Used   Vaping Use   • Vaping Use: Never used   Substance and Sexual Activity   • Alcohol use: Yes     Comment: occasional   • Drug use: No   • Sexual activity: Defer       Review of Systems:  The following systems were reviewed as they relate to the cardiovascular system:  Constitutional, Eyes, ENT, Cardiovascular, Respiratory, Gastrointestinal, Integumentary, Neurological, Psychiatric, Hematologic, Endocrine, Musculoskeletal, and Genitourinary. The pertinent cardiovascular findings are reported above with all other cardiovascular points within those systems being negative.    Diagnostic Study Review:     Current Electrocardiogram:    ECG 12 Lead    Date/Time: 7/18/2022 1:16 PM  Performed by: Nils Reyes DO  Authorized by: Nils Reyes DO   Comparison: not compared with previous ECG   Previous ECG: no previous ECG available  Comments: Normal sinus rhythm with a ventricular rate of 90 bpm.  Normal QT and QTc intervals.            Laboratory Data:  Lab Results   Component Value Date    GLUCOSE 86 06/14/2018    BUN 5 (L) 06/14/2018    CREATININE 0.8 06/14/2018    BCR 6.3 06/14/2018    K 3.9 06/14/2018    CO2 26 06/14/2018    CALCIUM 9.2 06/14/2018     Lab Results   Component Value Date    GLUCOSE 86 06/14/2018    CALCIUM 9.2 06/14/2018     06/14/2018    K 3.9 06/14/2018    CO2 26 06/14/2018     06/14/2018    BUN 5 (L) 06/14/2018    CREATININE 0.8 06/14/2018    BCR 6.3 06/14/2018    ANIONGAP 13.9 06/14/2018     No results found for: WBC, HGB, HCT, MCV, PLT  No results found for: CHOL, CHLPL, TRIG, HDL, LDL, LDLDIRECT  No results found for: HGBA1C  No results found for: INR, PROTIME    Most Recent Echo:       Most Recent Stress Test:       Most Recent Cardiac Catheterization:   No results found for this or any previous visit.       NOTE: The following portions of the patient's note were reviewed, confirmed and/or updated this visit as appropriate: History of present illness/Interval history, physical examination, assessment & plan, allergies, current medications, past family history, past medical history, past social history, past surgical history and problem list.

## 2022-08-26 ENCOUNTER — TELEPHONE (OUTPATIENT)
Dept: CARDIOLOGY | Facility: CLINIC | Age: 47
End: 2022-08-26

## 2022-08-26 NOTE — TELEPHONE ENCOUNTER
----- Message from Nils Reyes DO sent at 7/18/2022 11:00 AM EDT -----  Patient is going to need a PA for repatha or praluent whichever her insurance prefers.    It was denied before and there was confusion about her having an additional plan. Jess has caresource but its through the exchange (healthcare.gov) she said its not medicaid    Im going to write repatha

## 2022-08-26 NOTE — TELEPHONE ENCOUNTER
Called pt- she had labs done at [facility] last week- will have them fax results to us.        PA submitted on cover my meds        PA denied- check on office note- will need to send records with labs for appeal process        Appeal submitted        Per [place]- patient must sign a release for an appeal to be filed on her behalf. Patient informed- she is going to [place] for 2 weeks, will  the forms at [facility] office when she gets back around 8/18/22

## 2023-01-16 ENCOUNTER — OFFICE VISIT (OUTPATIENT)
Dept: CARDIOLOGY | Facility: CLINIC | Age: 48
End: 2023-01-16
Payer: COMMERCIAL

## 2023-01-16 VITALS
DIASTOLIC BLOOD PRESSURE: 78 MMHG | HEIGHT: 65 IN | WEIGHT: 282 LBS | SYSTOLIC BLOOD PRESSURE: 132 MMHG | BODY MASS INDEX: 46.98 KG/M2 | OXYGEN SATURATION: 98 % | HEART RATE: 75 BPM

## 2023-01-16 DIAGNOSIS — I10 PRIMARY HYPERTENSION: Primary | ICD-10-CM

## 2023-01-16 DIAGNOSIS — E78.1 FAMILIAL HYPERTRIGLYCERIDEMIA: ICD-10-CM

## 2023-01-16 DIAGNOSIS — E78.2 MIXED HYPERLIPIDEMIA: ICD-10-CM

## 2023-01-16 PROCEDURE — 99214 OFFICE O/P EST MOD 30 MIN: CPT | Performed by: INTERNAL MEDICINE

## 2023-01-16 PROCEDURE — 93000 ELECTROCARDIOGRAM COMPLETE: CPT | Performed by: INTERNAL MEDICINE

## 2023-01-16 RX ORDER — METOPROLOL SUCCINATE 25 MG/1
25 TABLET, EXTENDED RELEASE ORAL DAILY
Qty: 90 TABLET | Refills: 3 | Status: SHIPPED | OUTPATIENT
Start: 2023-01-16

## 2023-01-16 NOTE — PROGRESS NOTES
Cardiology Office Visit      Encounter Date:  01/16/2023    Patient ID:   Luiza Felton is a 47 y.o. female.    Reason For Followup:  Peripheral edema  Hypertension    Brief Clinical History:  Dear Dr. Patrick Vicente, ALISSA Johnson    I had the pleasure of seeing Luiza Felton today. As you are well aware, this is a 47 y.o. female with no known history of ischemic heart disease.  She does however have a history of hypertension, tachycardia, tobacco abuse and peripheral edema.  She presents today for follow-up on the above conditions.    Interval History:  She denies any chest pain pressure heaviness or tightness.  She denies any shortness of breath out of character.  She denies any PND orthopnea.  She denies any syncope or near-syncope.    Other than some occasional palpitations, she reports feeling quite well from a cardiac perspective.    On her last visit we had discussed PCSK9 inhibition.  Her insurance continues to be problematic and coverage. As you will recall, she has some difficulty with hyperlipidemia.  In fact with review of her laboratory data, it appears that this is primarily a hypertriglyceridemia issue.  Her triglycerides from November 2021 were 1285.  She was unable to tolerate atorvastatin but is tolerating moderate dose of pravastatin.  Statin therapy alone will not resolve her hypertriglyceridemia.  She reports that she had labs performed recently through your office that demonstrated some improvement.  We will request these for review.    Assessment & Plan    Impressions:  Peripheral edema-improved with fluid restriction  Hypertension-well-controlled today.  Palpitations- likely secondary to electrolyte derangement from diuretic  Familial hypertriglyceridemia     Intolerance to most statins (tolerating moderate dose of pravastatin)     Quite suboptimal control  History tachycardia.  Tobacco abuse.    Recommendations:  Continuation of her current cardiovascular regimen at the present time.      "This includes antihypertensives, statin, and as needed diuretic and potassium  Request labs from [facility] office  Will likely need PCSK9 inhibition to obtain reasonable control of her triglycerides.  Follow-up in 9 months    Diagnoses and all orders for this visit:    1. Primary hypertension (Primary)  -     ECG 12 Lead    2. Mixed hyperlipidemia  -     ECG 12 Lead    3. Familial hypertriglyceridemia  -     ECG 12 Lead    Other orders  -     metoprolol succinate XL (TOPROL-XL) 25 MG 24 hr tablet; Take 1 tablet by mouth Daily.  Dispense: 90 tablet; Refill: 3          Objective:    Vitals:  Vitals:    01/16/23 1029   BP: 132/78   Pulse: 75   SpO2: 98%   Weight: 128 kg (282 lb)   Height: 165.1 cm (65\")     Body mass index is 46.93 kg/m².      Physical Exam:    General: Alert, cooperative, no distress, appears stated age  Head:  Normocephalic, atraumatic, mucous membranes moist  Eyes:  Conjunctiva/corneas clear, EOM's intact     Neck:  Supple,  no bruit    Lungs: Clear to auscultation bilaterally, no wheezes rhonchi rales are noted  Chest wall: No tenderness  Heart::  Regular rate and rhythm, S1 and S2 normal, 1/6 holosystolic murmur.  No rub or gallop  Abdomen: Soft, non-tender, nondistended bowel sounds active.  Obese  Extremities: No cyanosis, clubbing, or edema  Pulses: 2+ and symmetric all extremities  Skin:  No rashes or lesions  Neuro/psych: A&O x3. CN II through XII are grossly intact with appropriate affect      Allergies:  Allergies   Allergen Reactions   • Atorvastatin Myalgia   • Lisinopril Cough       Medication Review:     Current Outpatient Medications:   •  metFORMIN (GLUCOPHAGE) 1000 MG tablet, Take 1,000 mg by mouth 2 (Two) Times a Day., Disp: , Rfl:   •  metoprolol succinate XL (TOPROL-XL) 25 MG 24 hr tablet, Take 1 tablet by mouth Daily., Disp: 90 tablet, Rfl: 3  •  pravastatin (PRAVACHOL) 40 MG tablet, Take 40 mg by mouth every night at bedtime., Disp: , Rfl:   •  furosemide (LASIX) 40 MG tablet, Take 1 " tablet by mouth Daily., Disp: 90 tablet, Rfl: 3  •  potassium chloride (K-DUR,KLOR-CON) 20 MEQ CR tablet, Take 1 tablet by mouth Daily., Disp: 90 tablet, Rfl: 3    Family History:  Family History   Problem Relation Age of Onset   • Lung cancer Mother    • Rheum arthritis Mother    • Heart disease Brother    • Heart attack Brother    • Stroke Maternal Grandmother        Past Medical History:  Past Medical History:   Diagnosis Date   • Anxiety and depression    • Bulging lumbar disc    • Hyperlipidemia    • Hypertension    • Obesity    • Osteoarthritis    • Palpitations    • Peripheral edema    • Sleep apnea     C-pap machine    • Tachycardia        Past Surgical History:  Past Surgical History:   Procedure Laterality Date   •  SECTION     • HYSTERECTOMY     • TONSILLECTOMY         Social History:  Social History     Socioeconomic History   • Marital status:    Tobacco Use   • Smoking status: Former     Packs/day: 1.50     Types: Cigarettes     Quit date: 2022     Years since quittin.3   • Smokeless tobacco: Never   Vaping Use   • Vaping Use: Never used   Substance and Sexual Activity   • Alcohol use: Yes     Comment: occasional   • Drug use: No   • Sexual activity: Defer       Review of Systems:  The following systems were reviewed as they relate to the cardiovascular system: Constitutional, Eyes, ENT, Cardiovascular, Respiratory, Gastrointestinal, Integumentary, Neurological, Psychiatric, Hematologic, Endocrine, Musculoskeletal, and Genitourinary. The pertinent cardiovascular findings are reported above with all other cardiovascular points within those systems being negative.    Diagnostic Study Review:     Current Electrocardiogram:    ECG 12 Lead    Date/Time: 2023 6:15 PM  Performed by: Nils Reyes DO  Authorized by: Nils Reyes DO   Comparison: not compared with previous ECG   Previous ECG: no previous ECG available  Comments: Normal sinus rhythm with  a ventricular rate of 75 bpm.  Normal QT and QTc intervals.  Normal QRS axis.            Laboratory Data:  Lab Results   Component Value Date    GLUCOSE 86 06/14/2018    BUN 5 (L) 06/14/2018    CREATININE 0.8 06/14/2018    BCR 6.3 06/14/2018    K 3.9 06/14/2018    CO2 26 06/14/2018    CALCIUM 9.2 06/14/2018     Lab Results   Component Value Date    GLUCOSE 86 06/14/2018    CALCIUM 9.2 06/14/2018     06/14/2018    K 3.9 06/14/2018    CO2 26 06/14/2018     06/14/2018    BUN 5 (L) 06/14/2018    CREATININE 0.8 06/14/2018    BCR 6.3 06/14/2018    ANIONGAP 13.9 06/14/2018     No results found for: WBC, HGB, HCT, MCV, PLT  No results found for: CHOL, CHLPL, TRIG, HDL, LDL, LDLDIRECT  No results found for: HGBA1C  No results found for: INR, PROTIME    Most Recent Echo:       Most Recent Stress Test:       Most Recent Cardiac Catheterization:   No results found for this or any previous visit.       NOTE: The following portions of the patient's note were reviewed, confirmed and/or updated this visit as appropriate: History of present illness/Interval history, physical examination, assessment & plan, allergies, current medications, past family history, past medical history, past social history, past surgical history and problem list.